# Patient Record
Sex: FEMALE | Race: OTHER | URBAN - METROPOLITAN AREA
[De-identification: names, ages, dates, MRNs, and addresses within clinical notes are randomized per-mention and may not be internally consistent; named-entity substitution may affect disease eponyms.]

---

## 2023-09-13 ENCOUNTER — OFFICE VISIT (OUTPATIENT)
Age: 18
End: 2023-09-13

## 2023-09-13 VITALS
OXYGEN SATURATION: 99 % | SYSTOLIC BLOOD PRESSURE: 130 MMHG | RESPIRATION RATE: 18 BRPM | WEIGHT: 155 LBS | DIASTOLIC BLOOD PRESSURE: 84 MMHG | HEART RATE: 72 BPM | TEMPERATURE: 98.1 F

## 2023-09-13 DIAGNOSIS — N89.8 VAGINAL DISCHARGE: Primary | ICD-10-CM

## 2023-09-13 RX ORDER — DOXYCYCLINE HYCLATE 100 MG
100 TABLET ORAL 2 TIMES DAILY
Qty: 14 TABLET | Refills: 0 | Status: SHIPPED | OUTPATIENT
Start: 2023-09-13 | End: 2023-09-20

## 2023-09-13 RX ORDER — METRONIDAZOLE 500 MG/1
500 TABLET ORAL 2 TIMES DAILY
Qty: 14 TABLET | Refills: 0 | Status: SHIPPED | OUTPATIENT
Start: 2023-09-13 | End: 2023-09-20

## 2023-09-13 RX ORDER — CEFTRIAXONE 500 MG/1
500 INJECTION, POWDER, FOR SOLUTION INTRAMUSCULAR; INTRAVENOUS ONCE
Status: COMPLETED | OUTPATIENT
Start: 2023-09-13 | End: 2023-09-13

## 2023-09-13 RX ADMIN — CEFTRIAXONE 500 MG: 500 INJECTION, POWDER, FOR SOLUTION INTRAMUSCULAR; INTRAVENOUS at 17:52

## 2023-09-13 ASSESSMENT — ENCOUNTER SYMPTOMS
ABDOMINAL PAIN: 0
SORE THROAT: 0

## 2023-09-14 LAB
SPECIMEN TYPE: NORMAL
TRICHOMONAS VAGINALIS SCREEN: NEGATIVE

## 2023-09-15 DIAGNOSIS — N89.8 VAGINAL DISCHARGE: ICD-10-CM

## 2023-09-15 LAB
C TRACH DNA UR QL NAA+PROBE: NEGATIVE
N GONORRHOEA DNA UR QL NAA+PROBE: NEGATIVE

## 2023-10-11 RX ORDER — METRONIDAZOLE 500 MG/1
500 TABLET ORAL 2 TIMES DAILY
Qty: 14 TABLET | Refills: 0 | OUTPATIENT
Start: 2023-10-11 | End: 2023-10-18

## 2023-10-11 RX ORDER — DOXYCYCLINE HYCLATE 100 MG
100 TABLET ORAL 2 TIMES DAILY
Qty: 14 TABLET | Refills: 0 | OUTPATIENT
Start: 2023-10-11 | End: 2023-10-18

## 2025-01-15 ENCOUNTER — HOSPITAL ENCOUNTER (EMERGENCY)
Age: 20
Discharge: HOME | End: 2025-01-15
Attending: STUDENT IN AN ORGANIZED HEALTH CARE EDUCATION/TRAINING PROGRAM

## 2025-01-15 VITALS
DIASTOLIC BLOOD PRESSURE: 83 MMHG | RESPIRATION RATE: 17 BRPM | OXYGEN SATURATION: 99 % | HEART RATE: 94 BPM | WEIGHT: 180 LBS | HEIGHT: 60 IN | TEMPERATURE: 97.3 F | SYSTOLIC BLOOD PRESSURE: 132 MMHG | BODY MASS INDEX: 35.34 KG/M2

## 2025-01-15 DIAGNOSIS — L02.91 ABSCESS: Primary | ICD-10-CM

## 2025-01-15 DIAGNOSIS — Z3A.01 5 WEEKS GESTATION OF PREGNANCY: ICD-10-CM

## 2025-01-15 PROCEDURE — 10060 I&D ABSCESS SIMPLE/SINGLE: CPT

## 2025-01-15 PROCEDURE — 99283 EMERGENCY DEPT VISIT LOW MDM: CPT

## 2025-01-15 RX ORDER — LIDOCAINE HYDROCHLORIDE AND EPINEPHRINE 10; 10 MG/ML; UG/ML
10 INJECTION, SOLUTION INFILTRATION; PERINEURAL ONCE
Status: DISCONTINUED | OUTPATIENT
Start: 2025-01-15 | End: 2025-01-15 | Stop reason: HOSPADM

## 2025-01-15 RX ORDER — CEPHALEXIN 500 MG/1
500 CAPSULE ORAL 4 TIMES DAILY
Qty: 28 CAPSULE | Refills: 0 | Status: SHIPPED | OUTPATIENT
Start: 2025-01-15 | End: 2025-01-22

## 2025-01-15 ASSESSMENT — PAIN DESCRIPTION - PAIN TYPE: TYPE: ACUTE PAIN

## 2025-01-15 ASSESSMENT — PAIN SCALES - GENERAL: PAINLEVEL_OUTOF10: 10

## 2025-01-15 ASSESSMENT — PAIN - FUNCTIONAL ASSESSMENT
PAIN_FUNCTIONAL_ASSESSMENT: ACTIVITIES ARE NOT PREVENTED
PAIN_FUNCTIONAL_ASSESSMENT: 0-10

## 2025-01-15 ASSESSMENT — PAIN DESCRIPTION - LOCATION: LOCATION: BUTTOCKS

## 2025-01-15 ASSESSMENT — PAIN DESCRIPTION - DESCRIPTORS: DESCRIPTORS: ACHING

## 2025-01-15 NOTE — DISCHARGE INSTRUCTIONS
Please keep the area covered with a gauze dressing and tape.  I recommend avoiding prolonged sitting on the area as this can make it worse.  Please fill the prescription for the antibiotic and take as directed.  I recommend that you are reevaluated within 48 hours to have the wound checked.  If your symptoms worsen in that time then please return earlier.

## 2025-01-15 NOTE — ED PROVIDER NOTES
Emergency Department Encounter      Patient: Anne-Marie Carrion  MRN: [id number]  : 2005  Date of Evaluation: 1/15/2025  PCP: Unknown, Provider, ANP  ED Provider:  Babar Keith DO    Triage Chief Complaint:    Abscess (To top of buttock, noticed Thursday. Not draining. )    HPI:   Anne-Marie Carrion is a 19 y.o. female that presents to the emergency department for concerns of an abscess towards the left superior aspect of her butt cheek.  She noticed it on Thursday, at first thought it was small however it has gotten significantly worse.  No associated fevers or chills.  She states it is uncomfortable to sit on the area and touch the area.  She is still tolerating p.o. intake without difficulty.  No abdominal pain.  Having normal bowel movements.  Declines any history of IBS or IBD or Crohn's disease.  Not having any purulence in her bowel movements.  No urinary symptoms.  Of note, she is 5 weeks pregnant, has had a positive pregnancy test.  She is scheduled for a follow-up appointment with her OB/GYN.  She declines any associated abdominal pain, no vaginal bleeding or discharge.  No urinary symptoms.      Note reviewed from 2023 including presentation for exposure to STD.  Patient treated with Rocephin, doxycycline and Flagyl.    History from : Patient    Limitations to history : None    MDM/ED Course:       In brief,       Pleasant 19-year-old female presenting to the emergency department as above.  Concern for abscess to the right superior aspect of her butt cheek around the gluteal region.  On exam, there is no rectal involvement.  No history of Crohn's disease or IBS to suggest tracking.  No systemic symptoms.  Of note 5 weeks pregnant but not having pregnancy related complaints including no abdominal pain vaginal bleeding or discharge.    The abscess appears superficial in nature, bedside I&D performed with moderate amount of serosanguineous/purulent discharge.  Patient had

## 2025-01-17 ENCOUNTER — HOSPITAL ENCOUNTER (EMERGENCY)
Age: 20
Discharge: HOME | End: 2025-01-17
Attending: EMERGENCY MEDICINE

## 2025-01-17 VITALS
HEIGHT: 60 IN | HEART RATE: 85 BPM | OXYGEN SATURATION: 99 % | TEMPERATURE: 98.3 F | DIASTOLIC BLOOD PRESSURE: 83 MMHG | BODY MASS INDEX: 35.34 KG/M2 | WEIGHT: 180 LBS | SYSTOLIC BLOOD PRESSURE: 125 MMHG | RESPIRATION RATE: 16 BRPM

## 2025-01-17 DIAGNOSIS — Z51.89 WOUND CHECK, ABSCESS: Primary | ICD-10-CM

## 2025-01-17 PROCEDURE — 99282 EMERGENCY DEPT VISIT SF MDM: CPT

## 2025-01-17 ASSESSMENT — LIFESTYLE VARIABLES
HOW OFTEN DO YOU HAVE A DRINK CONTAINING ALCOHOL: NEVER
HOW MANY STANDARD DRINKS CONTAINING ALCOHOL DO YOU HAVE ON A TYPICAL DAY: PATIENT DOES NOT DRINK

## 2025-01-17 ASSESSMENT — PAIN - FUNCTIONAL ASSESSMENT: PAIN_FUNCTIONAL_ASSESSMENT: NONE - DENIES PAIN

## 2025-01-17 NOTE — ED PROVIDER NOTES
Emergency Department Encounter  Location: [facility] EMERGENCY DEPARTMENT    Patient: Anne-Marie Carrion  MRN: [id number]  : 2005  Date of evaluation: 2025  ED Provider: Milady Romero DO    Chief Complaint:    Wound Check (States had abscess drained on Wednesday. Returns today to have area rechecked. Was told to return today. States area is much improved. )    [place]:  Anne-Marie Carrion is a 19 y.o. female that presents to the emergency department with request for recheck of abscess.  Patient was seen on Wednesday and had an abscess incised and drained.  Indicates that she has been able to take her antibiotic as prescribed.  States the area has improved significantly and is no longer as painful or red.  Has been able to eat and drink without vomiting or diarrhea.  No fever or chills.      History reviewed. No pertinent past medical history.  History reviewed. No pertinent surgical history.  History reviewed. No pertinent family history.  Social History     Socioeconomic History    Marital status: Single     Spouse name: Not on file    Number of children: Not on file    Years of education: Not on file    Highest education level: Not on file   Occupational History    Not on file   Tobacco Use    Smoking status: Unknown     Passive exposure: Never    Smokeless tobacco: Not on file   Substance and Sexual Activity    Alcohol use: Not Currently    Drug use: Yes     Types: Marijuana (Weed)    Sexual activity: Not on file   Other Topics Concern    Not on file   Social History Narrative    Not on file     Social Determinants of Health     Financial Resource Strain: Not on file   Food Insecurity: Not on file   Transportation Needs: Not on file   Physical Activity: Not on file   Stress: Not on file   Social Connections: Not on file   Intimate Partner Violence: Not on file   Housing Stability: Not on file     No current facility-administered medications for this encounter.     Current Outpatient

## 2025-02-04 ENCOUNTER — HOSPITAL ENCOUNTER (EMERGENCY)
Age: 20
Discharge: HOME | End: 2025-02-04
Attending: INTERNAL MEDICINE

## 2025-02-04 ENCOUNTER — APPOINTMENT (OUTPATIENT)
Dept: ULTRASOUND IMAGING | Age: 20
End: 2025-02-04

## 2025-02-04 VITALS
BODY MASS INDEX: 34.36 KG/M2 | DIASTOLIC BLOOD PRESSURE: 68 MMHG | WEIGHT: 175 LBS | HEART RATE: 100 BPM | RESPIRATION RATE: 16 BRPM | HEIGHT: 60 IN | SYSTOLIC BLOOD PRESSURE: 118 MMHG | TEMPERATURE: 98.4 F | OXYGEN SATURATION: 94 %

## 2025-02-04 DIAGNOSIS — O20.0 THREATENED MISCARRIAGE: Primary | ICD-10-CM

## 2025-02-04 LAB
ABO + RH BLD: NORMAL
ALBUMIN SERPL-MCNC: 4.1 G/DL (ref 3.4–5)
ALBUMIN/GLOB SERPL: 1.3 {RATIO} (ref 1.1–2.2)
ALP SERPL-CCNC: 81 U/L (ref 40–129)
ALT SERPL-CCNC: 19 U/L (ref 10–40)
ANION GAP SERPL CALCULATED.3IONS-SCNC: 9 MMOL/L (ref 3–16)
AST SERPL-CCNC: 24 U/L (ref 15–37)
B-HCG SERPL EIA 3RD IS-ACNC: 3657 MIU/ML
BACTERIA URNS QL MICRO: ABNORMAL /HPF
BASOPHILS # BLD: 0 K/UL (ref 0–0.2)
BASOPHILS NFR BLD: 0.3 %
BILIRUB SERPL-MCNC: <0.2 MG/DL (ref 0–1)
BILIRUB UR QL STRIP.AUTO: ABNORMAL
BUN SERPL-MCNC: 9 MG/DL (ref 7–20)
CALCIUM SERPL-MCNC: 9.1 MG/DL (ref 8.3–10.6)
CHLORIDE SERPL-SCNC: 103 MMOL/L (ref 99–110)
CLARITY UR: ABNORMAL
CO2 SERPL-SCNC: 24 MMOL/L (ref 21–32)
COLOR UR: ABNORMAL
CREAT SERPL-MCNC: 0.5 MG/DL (ref 0.6–1.1)
DEPRECATED RDW RBC AUTO: 14.2 % (ref 12.4–15.4)
EOSINOPHIL # BLD: 0 K/UL (ref 0–0.6)
EOSINOPHIL NFR BLD: 0.7 %
EPI CELLS #/AREA URNS AUTO: 9 /HPF (ref 0–5)
GFR SERPLBLD CREATININE-BSD FMLA CKD-EPI: >90 ML/MIN/{1.73_M2}
GLUCOSE SERPL-MCNC: 100 MG/DL (ref 70–99)
GLUCOSE UR STRIP.AUTO-MCNC: NEGATIVE MG/DL
HCT VFR BLD AUTO: 39.3 % (ref 36–48)
HGB BLD-MCNC: 13.2 G/DL (ref 12–16)
HGB UR QL STRIP.AUTO: ABNORMAL
HYALINE CASTS #/AREA URNS AUTO: 0 /LPF (ref 0–8)
KETONES UR STRIP.AUTO-MCNC: NEGATIVE MG/DL
LEUKOCYTE ESTERASE UR QL STRIP.AUTO: NEGATIVE
LYMPHOCYTES # BLD: 2.1 K/UL (ref 1–5.1)
LYMPHOCYTES NFR BLD: 33.8 %
MCH RBC QN AUTO: 29.5 PG (ref 26–34)
MCHC RBC AUTO-ENTMCNC: 33.6 G/DL (ref 31–36)
MCV RBC AUTO: 87.7 FL (ref 80–100)
MONOCYTES # BLD: 0.5 K/UL (ref 0–1.3)
MONOCYTES NFR BLD: 7.2 %
NEUTROPHILS # BLD: 3.7 K/UL (ref 1.7–7.7)
NEUTROPHILS NFR BLD: 58 %
NITRITE UR QL STRIP.AUTO: NEGATIVE
PH UR STRIP.AUTO: 8.5 [PH] (ref 5–8)
PLATELET # BLD AUTO: 318 K/UL (ref 135–450)
PMV BLD AUTO: 7.5 FL (ref 5–10.5)
POTASSIUM SERPL-SCNC: 4.1 MMOL/L (ref 3.5–5.1)
PROT SERPL-MCNC: 7.3 G/DL (ref 6.4–8.2)
PROT UR STRIP.AUTO-MCNC: >=300 MG/DL
RBC # BLD AUTO: 4.47 M/UL (ref 4–5.2)
RBC CLUMPS #/AREA URNS AUTO: 797 /HPF (ref 0–4)
SODIUM SERPL-SCNC: 136 MMOL/L (ref 136–145)
SP GR UR STRIP.AUTO: 1.02 (ref 1–1.03)
UA COMPLETE W REFLEX CULTURE PNL UR: ABNORMAL
UA DIPSTICK W REFLEX MICRO PNL UR: YES
URN SPEC COLLECT METH UR: ABNORMAL
UROBILINOGEN UR STRIP-ACNC: 0.2 E.U./DL
WBC # BLD AUTO: 6.3 K/UL (ref 4–11)
WBC #/AREA URNS AUTO: 6 /HPF (ref 0–5)

## 2025-02-04 PROCEDURE — 99284 EMERGENCY DEPT VISIT MOD MDM: CPT

## 2025-02-04 PROCEDURE — 80053 COMPREHEN METABOLIC PANEL: CPT

## 2025-02-04 PROCEDURE — 81001 URINALYSIS AUTO W/SCOPE: CPT

## 2025-02-04 PROCEDURE — 86901 BLOOD TYPING SEROLOGIC RH(D): CPT

## 2025-02-04 PROCEDURE — 86900 BLOOD TYPING SEROLOGIC ABO: CPT

## 2025-02-04 PROCEDURE — 85025 COMPLETE CBC W/AUTO DIFF WBC: CPT

## 2025-02-04 PROCEDURE — 84702 CHORIONIC GONADOTROPIN TEST: CPT

## 2025-02-04 PROCEDURE — 76817 TRANSVAGINAL US OBSTETRIC: CPT

## 2025-02-04 ASSESSMENT — ENCOUNTER SYMPTOMS
DIARRHEA: 0
CHEST TIGHTNESS: 0
SHORTNESS OF BREATH: 0
VOMITING: 0
RESPIRATORY NEGATIVE: 1
COUGH: 0
COLOR CHANGE: 0
ABDOMINAL PAIN: 0
BACK PAIN: 0
NAUSEA: 0
CONSTIPATION: 0

## 2025-02-04 ASSESSMENT — PAIN SCALES - GENERAL: PAINLEVEL_OUTOF10: 0

## 2025-02-04 NOTE — ED PROVIDER NOTES
EMERGENCY MEDICINE PROVIDER NOTE    Patient Identification  Pt Name: Anne-Marie Carrion  MRN: [id number]  Birthdate 2005  Date of evaluation: 2025  Provider: LENORA CALLE DO  PCP: Unknown, Provider, ANP    Chief Complaint  Pregnancy Problem (Pt to ED c/o vaginal bleeding that began today. Bleeding is light to moderate with tiny blood clots. Pt is 8 weeks pregnant. PT denies pain. Pt Ob/Gyn is through [facility]. Pt is .)      HPI  (History provided by patient)  This is a 19 y.o. female who was brought in by self for vaginal bleeding with clots that started today.  Patient has seen OB/GYN at [facility].  Patient was seen recently and had an ultrasound that revealed an IUP with a heartbeat.  Patient is G1, P0.  Patient denies abdominal pain.  Patient denies weakness or palpitations.  Patient denies any vaginal discharge.    I have reviewed the following nursing documentation:  Allergies: Patient has no known allergies.    Past medical history: History reviewed. No pertinent past medical history.  Past surgical history: History reviewed. No pertinent surgical history.    Home medications:   There are no discharge medications for this patient.      Social history:  reports that she does not currently use alcohol. She reports current drug use. Drug: Marijuana (Weed).    Family history:  History reviewed. No pertinent family history.    Exam  ED Triage Vitals   BP Systolic BP Percentile Diastolic BP Percentile Temp Temp Source Pulse Respirations SpO2   25 -- -- 25 0919 25 0919 25 0919 25 0919 25 0919   118/68   98.4 °F (36.9 °C) Oral 100 16 94 %      Height Weight - Scale         25 0918 2518         1.524 m (5') 79.4 kg (175 lb)           Nursing note and vitals reviewed.  General: Patient does not appear to be in acute distress.  Head: Atraumatic.   ENT: No evidence of angioedema.   Eyes: Anicteric sclera. No discharge.

## 2025-02-04 NOTE — ED PROVIDER NOTES
[facility] EMERGENCY DEPARTMENT  EMERGENCY DEPARTMENT ENCOUNTER        Pt Name: Anne-Marie Carrion  MRN: [id number]  Birthdate 2005  Date of evaluation: 2025  Provider: REBECCA Romeo  PCP: Unknown, Provider, ANP  Note Started: 9:50 AM EST 25       I have seen and evaluated this patient with my supervising physician Bib      CHIEF COMPLAINT       Chief Complaint   Patient presents with    Pregnancy Problem     Pt to ED c/o vaginal bleeding that began today. Bleeding is light to moderate with tiny blood clots. Pt is 8 weeks pregnant. PT denies pain. Pt Ob/Gyn is through [facility]. Pt is .       HISTORY OF PRESENT ILLNESS: 1 or more Elements     History From: Patient  Limitations to history : None    Anne-Marie Carrion is a 19 y.o. female with no significant past medical history who presents ED with complaint of a pregnancy problem.  Woke up today with some bleeding from the vagina.  She states bleeding is light.  She has put on a pad.  Has not soaked through her blood through pad since this morning.  She reports she is 8 weeks pregnant.  She denies any abdominal cramping or pain.  Denies dysuria, frequency, urgency or hematuria.  Denies changes in bowel movements.  Denies nausea or vomiting.  Denies pain or shortness of breath.  She reports she follows up with OB/GYN and [facility].  She reports had ultrasound in the office last week and had intrauterine pregnancy with active heartbeat to baby.  She became concerned and came to the ED for further evaluation and treatment.  She is G1, P0 Ab0.     Nursing Notes were all reviewed and agreed with or any disagreements were addressed in the HPI.    REVIEW OF SYSTEMS :      Review of Systems   Constitutional:  Negative for activity change, appetite change, chills, diaphoresis, fatigue and fever.   Respiratory: Negative.  Negative for cough, chest tightness and shortness of breath.    Cardiovascular: